# Patient Record
Sex: FEMALE | Race: WHITE | NOT HISPANIC OR LATINO | Employment: UNEMPLOYED | URBAN - METROPOLITAN AREA
[De-identification: names, ages, dates, MRNs, and addresses within clinical notes are randomized per-mention and may not be internally consistent; named-entity substitution may affect disease eponyms.]

---

## 2023-05-02 LAB
ALANINE AMINOTRANSFERASE (SGPT) (U/L) IN SER/PLAS: 74 U/L (ref 7–45)
ALBUMIN (G/DL) IN SER/PLAS: 4.3 G/DL (ref 3.4–5)
ALKALINE PHOSPHATASE (U/L) IN SER/PLAS: 95 U/L (ref 33–136)
ANION GAP IN SER/PLAS: 16 MMOL/L (ref 10–20)
ASPARTATE AMINOTRANSFERASE (SGOT) (U/L) IN SER/PLAS: 55 U/L (ref 9–39)
BILIRUBIN TOTAL (MG/DL) IN SER/PLAS: 0.8 MG/DL (ref 0–1.2)
CALCIUM (MG/DL) IN SER/PLAS: 9.7 MG/DL (ref 8.6–10.3)
CARBON DIOXIDE, TOTAL (MMOL/L) IN SER/PLAS: 24 MMOL/L (ref 21–32)
CHLORIDE (MMOL/L) IN SER/PLAS: 101 MMOL/L (ref 98–107)
CREATININE (MG/DL) IN SER/PLAS: 0.64 MG/DL (ref 0.5–1.05)
GFR FEMALE: >90 ML/MIN/1.73M2
GLUCOSE (MG/DL) IN SER/PLAS: 98 MG/DL (ref 74–99)
POTASSIUM (MMOL/L) IN SER/PLAS: 4.4 MMOL/L (ref 3.5–5.3)
PROTEIN TOTAL: 7.6 G/DL (ref 6.4–8.2)
SODIUM (MMOL/L) IN SER/PLAS: 137 MMOL/L (ref 136–145)
THYROTROPIN (MIU/L) IN SER/PLAS BY DETECTION LIMIT <= 0.05 MIU/L: 0.63 MIU/L (ref 0.44–3.98)
UREA NITROGEN (MG/DL) IN SER/PLAS: 14 MG/DL (ref 6–23)

## 2023-07-28 LAB
C REACTIVE PROTEIN (MG/L) IN SER/PLAS: 1.59 MG/DL
SEDIMENTATION RATE, ERYTHROCYTE: 12 MM/H (ref 0–30)

## 2023-08-29 PROBLEM — L29.8 PRURITIC ERYTHEMATOUS RASH: Status: ACTIVE | Noted: 2023-08-29

## 2023-08-29 PROBLEM — I82.90 VENOUS THROMBOSIS: Status: ACTIVE | Noted: 2023-08-29

## 2023-08-29 PROBLEM — R73.09 ELEVATED GLUCOSE: Status: ACTIVE | Noted: 2023-08-29

## 2023-08-29 PROBLEM — H53.452: Status: ACTIVE | Noted: 2023-08-29

## 2023-08-29 PROBLEM — M16.9 HIP ARTHROSIS: Status: ACTIVE | Noted: 2023-08-29

## 2023-08-29 PROBLEM — I47.10 SVT (SUPRAVENTRICULAR TACHYCARDIA) (CMS-HCC): Status: ACTIVE | Noted: 2023-08-29

## 2023-08-29 PROBLEM — I83.813 VARICOSE VEINS OF BILATERAL LOWER EXTREMITIES WITH PAIN: Status: ACTIVE | Noted: 2023-08-29

## 2023-08-29 PROBLEM — R10.9 RIGHT FLANK PAIN: Status: ACTIVE | Noted: 2023-08-29

## 2023-08-29 PROBLEM — C56.9 OVARIAN CA (MULTI): Status: ACTIVE | Noted: 2023-08-29

## 2023-08-29 PROBLEM — S42.209A PROXIMAL HUMERUS FRACTURE: Status: ACTIVE | Noted: 2023-08-29

## 2023-08-29 PROBLEM — B35.9 TINEA: Status: ACTIVE | Noted: 2023-08-29

## 2023-08-29 PROBLEM — J34.89 NASAL VESTIBULITIS: Status: ACTIVE | Noted: 2023-08-29

## 2023-08-29 PROBLEM — M70.62 GREATER TROCHANTERIC BURSITIS, LEFT: Status: ACTIVE | Noted: 2023-08-29

## 2023-08-29 PROBLEM — S72.009A FRACTURE OF NECK OF FEMUR (MULTI): Status: ACTIVE | Noted: 2023-08-29

## 2023-08-29 PROBLEM — R29.898 SHOULDER WEAKNESS: Status: ACTIVE | Noted: 2023-08-29

## 2023-08-29 PROBLEM — R26.9 GAIT ABNORMALITY: Status: ACTIVE | Noted: 2023-08-29

## 2023-08-29 PROBLEM — R07.9 CHEST PAIN: Status: ACTIVE | Noted: 2023-08-29

## 2023-08-29 PROBLEM — E55.9 VITAMIN D DEFICIENCY: Status: ACTIVE | Noted: 2023-08-29

## 2023-08-29 PROBLEM — M76.829 POSTERIOR TIBIAL TENDON DYSFUNCTION: Status: ACTIVE | Noted: 2023-08-29

## 2023-08-29 PROBLEM — M25.571 RIGHT ANKLE PAIN: Status: ACTIVE | Noted: 2023-08-29

## 2023-08-29 PROBLEM — M79.18 MYOFASCIAL PAIN: Status: ACTIVE | Noted: 2023-08-29

## 2023-08-29 PROBLEM — E03.9 HYPOTHYROIDISM: Status: ACTIVE | Noted: 2023-08-29

## 2023-08-29 PROBLEM — F41.1 GENERALIZED ANXIETY DISORDER: Status: ACTIVE | Noted: 2023-08-29

## 2023-08-29 PROBLEM — M25.612 STIFFNESS OF LEFT SHOULDER JOINT: Status: ACTIVE | Noted: 2023-08-29

## 2023-08-29 PROBLEM — E66.9 OBESITY: Status: ACTIVE | Noted: 2023-08-29

## 2023-08-29 PROBLEM — M54.12 CERVICAL RADICULITIS: Status: ACTIVE | Noted: 2023-08-29

## 2023-08-29 PROBLEM — F32.A DEPRESSION: Status: ACTIVE | Noted: 2023-08-29

## 2023-08-29 PROBLEM — H47.012 NAION (NON-ARTERITIC ANTERIOR ISCHEMIC OPTIC NEUROPATHY), LEFT EYE: Status: ACTIVE | Noted: 2023-08-29

## 2023-08-29 PROBLEM — E06.3 HASHIMOTO'S THYROIDITIS: Status: ACTIVE | Noted: 2023-08-29

## 2023-08-29 PROBLEM — L29.89 PRURITIC ERYTHEMATOUS RASH: Status: ACTIVE | Noted: 2023-08-29

## 2023-08-29 PROBLEM — L92.0 GRANULOMA ANNULARE: Status: ACTIVE | Noted: 2023-08-29

## 2023-08-29 PROBLEM — M81.0 POSTMENOPAUSAL OSTEOPOROSIS: Status: ACTIVE | Noted: 2023-08-29

## 2023-08-29 RX ORDER — ACETAMINOPHEN 500 MG/1
CAPSULE, LIQUID FILLED ORAL
COMMUNITY
End: 2023-11-07 | Stop reason: WASHOUT

## 2023-08-29 RX ORDER — CHOLECALCIFEROL (VITAMIN D3) 50 MCG
1 TABLET ORAL DAILY
COMMUNITY
End: 2023-11-07 | Stop reason: WASHOUT

## 2023-08-29 RX ORDER — ASCORBIC ACID 250 MG
1 TABLET ORAL DAILY
COMMUNITY

## 2023-08-29 RX ORDER — ASPIRIN 325 MG
1 TABLET ORAL 2 TIMES DAILY
COMMUNITY
End: 2023-11-07 | Stop reason: WASHOUT

## 2023-08-29 RX ORDER — FLUTICASONE PROPIONATE 50 MCG
2 SPRAY, SUSPENSION (ML) NASAL DAILY
COMMUNITY
Start: 2019-07-01 | End: 2023-11-07 | Stop reason: WASHOUT

## 2023-08-29 RX ORDER — FLUTICASONE PROPIONATE 50 MCG
1 SPRAY, SUSPENSION (ML) NASAL DAILY
COMMUNITY

## 2023-08-29 RX ORDER — OXYCODONE HYDROCHLORIDE 5 MG/1
2 TABLET ORAL EVERY 4 HOURS PRN
COMMUNITY
End: 2023-11-07 | Stop reason: WASHOUT

## 2023-08-29 RX ORDER — CYCLOSPORINE 0.5 MG/ML
1 EMULSION OPHTHALMIC 2 TIMES DAILY
COMMUNITY

## 2023-08-29 RX ORDER — ZINC GLUCONATE 50 MG
1 TABLET ORAL DAILY
COMMUNITY
End: 2023-11-07 | Stop reason: WASHOUT

## 2023-08-29 RX ORDER — CHOLECALCIFEROL (VITAMIN D3) 25 MCG
3 TABLET ORAL DAILY
COMMUNITY

## 2023-08-29 RX ORDER — TRIAMCINOLONE ACETONIDE 1 MG/G
1 CREAM TOPICAL
COMMUNITY
Start: 2019-07-16 | End: 2023-11-07 | Stop reason: WASHOUT

## 2023-08-29 RX ORDER — LEVOTHYROXINE SODIUM 125 UG/1
1 TABLET ORAL EVERY MORNING
COMMUNITY
End: 2023-11-13 | Stop reason: SDUPTHER

## 2023-08-29 RX ORDER — HYDROCODONE BITARTRATE AND ACETAMINOPHEN 5; 325 MG/1; MG/1
1 TABLET ORAL EVERY 6 HOURS PRN
COMMUNITY
Start: 2022-06-24 | End: 2023-11-07 | Stop reason: WASHOUT

## 2023-08-29 RX ORDER — LEVOTHYROXINE SODIUM 137 UG/1
1 TABLET ORAL DAILY
COMMUNITY
Start: 2013-12-03 | End: 2023-11-07 | Stop reason: WASHOUT

## 2023-08-29 RX ORDER — SODIUM FLUORIDE 5 MG/G
PASTE, DENTIFRICE DENTAL
COMMUNITY
Start: 2022-02-16

## 2023-11-06 ENCOUNTER — LAB (OUTPATIENT)
Dept: LAB | Facility: LAB | Age: 63
End: 2023-11-06
Payer: MEDICARE

## 2023-11-06 DIAGNOSIS — R63.4 ABNORMAL WEIGHT LOSS: ICD-10-CM

## 2023-11-06 DIAGNOSIS — E03.9 HYPOTHYROIDISM, UNSPECIFIED: Primary | ICD-10-CM

## 2023-11-06 DIAGNOSIS — R74.8 ABNORMAL LEVELS OF OTHER SERUM ENZYMES: ICD-10-CM

## 2023-11-06 LAB
ALBUMIN SERPL BCP-MCNC: 4.5 G/DL (ref 3.4–5)
ALP SERPL-CCNC: 101 U/L (ref 33–136)
ALT SERPL W P-5'-P-CCNC: 60 U/L (ref 7–45)
ANION GAP SERPL CALC-SCNC: 20 MMOL/L (ref 10–20)
AST SERPL W P-5'-P-CCNC: 60 U/L (ref 9–39)
BILIRUB SERPL-MCNC: 1.1 MG/DL (ref 0–1.2)
BUN SERPL-MCNC: 16 MG/DL (ref 6–23)
CALCIUM SERPL-MCNC: 9.8 MG/DL (ref 8.6–10.3)
CHLORIDE SERPL-SCNC: 100 MMOL/L (ref 98–107)
CHOLEST SERPL-MCNC: 255 MG/DL (ref 0–199)
CHOLESTEROL/HDL RATIO: 5.1
CO2 SERPL-SCNC: 23 MMOL/L (ref 21–32)
CREAT SERPL-MCNC: 0.63 MG/DL (ref 0.5–1.05)
GFR SERPL CREATININE-BSD FRML MDRD: >90 ML/MIN/1.73M*2
GLUCOSE SERPL-MCNC: 100 MG/DL (ref 74–99)
HDLC SERPL-MCNC: 50.3 MG/DL
LDLC SERPL CALC-MCNC: 181 MG/DL
NON HDL CHOLESTEROL: 205 MG/DL (ref 0–149)
POTASSIUM SERPL-SCNC: 4.7 MMOL/L (ref 3.5–5.3)
PROT SERPL-MCNC: 8 G/DL (ref 6.4–8.2)
SODIUM SERPL-SCNC: 138 MMOL/L (ref 136–145)
TRIGL SERPL-MCNC: 119 MG/DL (ref 0–149)
TSH SERPL-ACNC: 1.16 MIU/L (ref 0.44–3.98)
VLDL: 24 MG/DL (ref 0–40)

## 2023-11-06 PROCEDURE — 36415 COLL VENOUS BLD VENIPUNCTURE: CPT

## 2023-11-06 PROCEDURE — 80061 LIPID PANEL: CPT

## 2023-11-06 PROCEDURE — 80053 COMPREHEN METABOLIC PANEL: CPT

## 2023-11-06 PROCEDURE — 84443 ASSAY THYROID STIM HORMONE: CPT

## 2023-11-08 ENCOUNTER — OFFICE VISIT (OUTPATIENT)
Dept: PRIMARY CARE | Facility: CLINIC | Age: 63
End: 2023-11-08
Payer: COMMERCIAL

## 2023-11-08 VITALS
SYSTOLIC BLOOD PRESSURE: 116 MMHG | DIASTOLIC BLOOD PRESSURE: 78 MMHG | BODY MASS INDEX: 40.03 KG/M2 | OXYGEN SATURATION: 96 % | WEIGHT: 259.4 LBS | HEART RATE: 66 BPM

## 2023-11-08 DIAGNOSIS — E06.3 HASHIMOTO'S THYROIDITIS: ICD-10-CM

## 2023-11-08 DIAGNOSIS — E66.01 OBESITY, MORBID (MULTI): ICD-10-CM

## 2023-11-08 DIAGNOSIS — R73.09 ELEVATED GLUCOSE: ICD-10-CM

## 2023-11-08 DIAGNOSIS — E78.2 MIXED HYPERLIPIDEMIA: ICD-10-CM

## 2023-11-08 DIAGNOSIS — R74.8 ELEVATED LIVER ENZYMES: Primary | ICD-10-CM

## 2023-11-08 PROBLEM — M25.571 RIGHT ANKLE PAIN: Status: RESOLVED | Noted: 2023-08-29 | Resolved: 2023-11-08

## 2023-11-08 PROBLEM — R07.9 CHEST PAIN: Status: RESOLVED | Noted: 2023-08-29 | Resolved: 2023-11-08

## 2023-11-08 PROBLEM — E03.9 HYPOTHYROIDISM: Status: RESOLVED | Noted: 2023-08-29 | Resolved: 2023-11-08

## 2023-11-08 PROBLEM — M79.18 MYOFASCIAL PAIN: Status: RESOLVED | Noted: 2023-08-29 | Resolved: 2023-11-08

## 2023-11-08 PROBLEM — S72.009A FRACTURE OF NECK OF FEMUR (MULTI): Status: RESOLVED | Noted: 2023-08-29 | Resolved: 2023-11-08

## 2023-11-08 PROBLEM — M70.62 GREATER TROCHANTERIC BURSITIS, LEFT: Status: RESOLVED | Noted: 2023-08-29 | Resolved: 2023-11-08

## 2023-11-08 PROBLEM — S42.209A PROXIMAL HUMERUS FRACTURE: Status: RESOLVED | Noted: 2023-08-29 | Resolved: 2023-11-08

## 2023-11-08 PROBLEM — C56.9 OVARIAN CA (MULTI): Status: RESOLVED | Noted: 2023-08-29 | Resolved: 2023-11-08

## 2023-11-08 PROBLEM — F40.00 AGORAPHOBIA: Status: ACTIVE | Noted: 2023-11-08

## 2023-11-08 PROBLEM — R10.9 RIGHT FLANK PAIN: Status: RESOLVED | Noted: 2023-08-29 | Resolved: 2023-11-08

## 2023-11-08 PROBLEM — J34.89 NASAL VESTIBULITIS: Status: RESOLVED | Noted: 2023-08-29 | Resolved: 2023-11-08

## 2023-11-08 PROCEDURE — 1036F TOBACCO NON-USER: CPT | Performed by: FAMILY MEDICINE

## 2023-11-08 PROCEDURE — 99214 OFFICE O/P EST MOD 30 MIN: CPT | Performed by: FAMILY MEDICINE

## 2023-11-08 ASSESSMENT — ENCOUNTER SYMPTOMS
LOSS OF SENSATION IN FEET: 0
OCCASIONAL FEELINGS OF UNSTEADINESS: 1
DEPRESSION: 0

## 2023-11-08 ASSESSMENT — LIFESTYLE VARIABLES
HOW OFTEN DO YOU HAVE SIX OR MORE DRINKS ON ONE OCCASION: NEVER
SKIP TO QUESTIONS 9-10: 1
HOW MANY STANDARD DRINKS CONTAINING ALCOHOL DO YOU HAVE ON A TYPICAL DAY: 1 OR 2
AUDIT-C TOTAL SCORE: 4
HOW OFTEN DO YOU HAVE A DRINK CONTAINING ALCOHOL: 4 OR MORE TIMES A WEEK

## 2023-11-08 ASSESSMENT — PATIENT HEALTH QUESTIONNAIRE - PHQ9
1. LITTLE INTEREST OR PLEASURE IN DOING THINGS: NOT AT ALL
2. FEELING DOWN, DEPRESSED OR HOPELESS: NOT AT ALL
SUM OF ALL RESPONSES TO PHQ9 QUESTIONS 1 & 2: 0

## 2023-11-08 ASSESSMENT — PAIN SCALES - GENERAL: PAINLEVEL: 2

## 2023-11-08 NOTE — PROGRESS NOTES
Subjective   Patient ID: Genevieve Hernandez is a 62 y.o. female who presents for 6 MO WT.    Here for follow up.      Since last visit - saw Dr. Chirinos.  Recommended hip revision.      Pt has new brace on right ankle - exercising more.  Using silver sneaker.  More energy.  Cloths feeling better.               Review of Systems    Objective   /78 (BP Location: Right arm, Patient Position: Sitting)   Pulse 66   Wt 118 kg (259 lb 6.4 oz)   SpO2 96%   BMI 40.03 kg/m²     Physical Exam    Assessment/Plan   Diagnoses and all orders for this visit:  Elevated liver enzymes  Hashimoto's thyroiditis  Mixed hyperlipidemia  Elevated glucose    Patient Instructions   For elevated liver enzymes - recommend US of liver and blood work to rule out hepatitis.  Likely fatty liver disease.  Continue weight loss. Continue intermittent fasting.  Continue exercise.      For thyroid - normal range.      For cholesterol - we will check CT cardiac score.  If low risk - continue dietary changes/exercises.  For high risk we will discuss other options.      Follow up in 6 months for physical.  Recheck blood work prior.

## 2023-11-08 NOTE — PATIENT INSTRUCTIONS
For elevated liver enzymes - recommend US of liver and blood work to rule out hepatitis.  Likely fatty liver disease.  Continue weight loss. Continue intermittent fasting.  Continue exercise.      For thyroid - normal range.      For cholesterol - we will check CT cardiac score.  If low risk - continue dietary changes/exercises.  For high risk we will discuss other options.      Follow up in 6 months for physical.  Recheck blood work prior.

## 2023-11-13 DIAGNOSIS — E06.3 HASHIMOTO'S THYROIDITIS: Primary | ICD-10-CM

## 2023-11-13 RX ORDER — LEVOTHYROXINE SODIUM 125 UG/1
125 TABLET ORAL EVERY MORNING
Qty: 90 TABLET | Refills: 1 | Status: SHIPPED | OUTPATIENT
Start: 2023-11-13 | End: 2024-05-22 | Stop reason: SDUPTHER

## 2023-11-13 NOTE — TELEPHONE ENCOUNTER
Requested Prescriptions     Pending Prescriptions Disp Refills    levothyroxine (Synthroid) 125 mcg tablet 90 tablet 1     Sig: Take 1 tablet (125 mcg) by mouth once daily in the morning. On an empty stomach. For 90 days

## 2023-12-05 ENCOUNTER — ANCILLARY PROCEDURE (OUTPATIENT)
Dept: RADIOLOGY | Facility: HOSPITAL | Age: 63
End: 2023-12-05
Payer: COMMERCIAL

## 2023-12-05 DIAGNOSIS — R74.8 ELEVATED LIVER ENZYMES: ICD-10-CM

## 2023-12-05 PROCEDURE — 76705 ECHO EXAM OF ABDOMEN: CPT

## 2023-12-05 PROCEDURE — 76705 ECHO EXAM OF ABDOMEN: CPT | Performed by: RADIOLOGY

## 2023-12-13 ENCOUNTER — APPOINTMENT (OUTPATIENT)
Dept: RADIOLOGY | Facility: HOSPITAL | Age: 63
End: 2023-12-13
Payer: COMMERCIAL

## 2024-05-02 ENCOUNTER — LAB (OUTPATIENT)
Dept: LAB | Facility: LAB | Age: 64
End: 2024-05-02
Payer: COMMERCIAL

## 2024-05-02 DIAGNOSIS — R74.8 ELEVATED LIVER ENZYMES: ICD-10-CM

## 2024-05-02 LAB
FERRITIN SERPL-MCNC: 201 NG/ML (ref 8–150)
HBV SURFACE AG SERPL QL IA: NONREACTIVE
HCV AB SER QL: NONREACTIVE
IRON SATN MFR SERPL: 29 % (ref 25–45)
IRON SERPL-MCNC: 108 UG/DL (ref 35–150)
TIBC SERPL-MCNC: 377 UG/DL (ref 240–445)
UIBC SERPL-MCNC: 269 UG/DL (ref 110–370)

## 2024-05-02 PROCEDURE — 87340 HEPATITIS B SURFACE AG IA: CPT

## 2024-05-02 PROCEDURE — 36415 COLL VENOUS BLD VENIPUNCTURE: CPT

## 2024-05-02 PROCEDURE — 86803 HEPATITIS C AB TEST: CPT

## 2024-05-02 PROCEDURE — 83540 ASSAY OF IRON: CPT

## 2024-05-02 PROCEDURE — 83550 IRON BINDING TEST: CPT

## 2024-05-02 PROCEDURE — 82728 ASSAY OF FERRITIN: CPT

## 2024-05-07 ENCOUNTER — LAB (OUTPATIENT)
Dept: LAB | Facility: LAB | Age: 64
End: 2024-05-07
Payer: COMMERCIAL

## 2024-05-07 DIAGNOSIS — R74.8 ELEVATED LIVER ENZYMES: ICD-10-CM

## 2024-05-07 DIAGNOSIS — E06.3 HASHIMOTO'S THYROIDITIS: ICD-10-CM

## 2024-05-07 DIAGNOSIS — R73.09 ELEVATED GLUCOSE: ICD-10-CM

## 2024-05-07 DIAGNOSIS — E78.2 MIXED HYPERLIPIDEMIA: ICD-10-CM

## 2024-05-07 LAB
ALBUMIN SERPL BCP-MCNC: 4.3 G/DL (ref 3.4–5)
ALP SERPL-CCNC: 88 U/L (ref 33–136)
ALT SERPL W P-5'-P-CCNC: 38 U/L (ref 7–45)
ANION GAP SERPL CALC-SCNC: 14 MMOL/L (ref 10–20)
AST SERPL W P-5'-P-CCNC: 37 U/L (ref 9–39)
BASOPHILS # BLD AUTO: 0.04 X10*3/UL (ref 0–0.1)
BASOPHILS NFR BLD AUTO: 0.4 %
BILIRUB SERPL-MCNC: 0.8 MG/DL (ref 0–1.2)
BUN SERPL-MCNC: 13 MG/DL (ref 6–23)
CALCIUM SERPL-MCNC: 9.9 MG/DL (ref 8.6–10.3)
CHLORIDE SERPL-SCNC: 101 MMOL/L (ref 98–107)
CHOLEST SERPL-MCNC: 237 MG/DL (ref 0–199)
CHOLESTEROL/HDL RATIO: 4.3
CO2 SERPL-SCNC: 27 MMOL/L (ref 21–32)
CREAT SERPL-MCNC: 0.88 MG/DL (ref 0.5–1.05)
EGFRCR SERPLBLD CKD-EPI 2021: 75 ML/MIN/1.73M*2
EOSINOPHIL # BLD AUTO: 0.19 X10*3/UL (ref 0–0.7)
EOSINOPHIL NFR BLD AUTO: 2.1 %
ERYTHROCYTE [DISTWIDTH] IN BLOOD BY AUTOMATED COUNT: 12.5 % (ref 11.5–14.5)
GLUCOSE SERPL-MCNC: 84 MG/DL (ref 74–99)
HCT VFR BLD AUTO: 44.7 % (ref 36–46)
HDLC SERPL-MCNC: 55.3 MG/DL
HGB BLD-MCNC: 14.2 G/DL (ref 12–16)
IMM GRANULOCYTES # BLD AUTO: 0.03 X10*3/UL (ref 0–0.7)
IMM GRANULOCYTES NFR BLD AUTO: 0.3 % (ref 0–0.9)
LDLC SERPL CALC-MCNC: 138 MG/DL
LYMPHOCYTES # BLD AUTO: 2.07 X10*3/UL (ref 1.2–4.8)
LYMPHOCYTES NFR BLD AUTO: 22.4 %
MCH RBC QN AUTO: 31.8 PG (ref 26–34)
MCHC RBC AUTO-ENTMCNC: 31.8 G/DL (ref 32–36)
MCV RBC AUTO: 100 FL (ref 80–100)
MONOCYTES # BLD AUTO: 0.76 X10*3/UL (ref 0.1–1)
MONOCYTES NFR BLD AUTO: 8.2 %
NEUTROPHILS # BLD AUTO: 6.15 X10*3/UL (ref 1.2–7.7)
NEUTROPHILS NFR BLD AUTO: 66.6 %
NON HDL CHOLESTEROL: 182 MG/DL (ref 0–149)
NRBC BLD-RTO: 0 /100 WBCS (ref 0–0)
PLATELET # BLD AUTO: 310 X10*3/UL (ref 150–450)
POTASSIUM SERPL-SCNC: 4.7 MMOL/L (ref 3.5–5.3)
PROT SERPL-MCNC: 7.7 G/DL (ref 6.4–8.2)
RBC # BLD AUTO: 4.46 X10*6/UL (ref 4–5.2)
SODIUM SERPL-SCNC: 137 MMOL/L (ref 136–145)
TRIGL SERPL-MCNC: 217 MG/DL (ref 0–149)
TSH SERPL-ACNC: 3.09 MIU/L (ref 0.44–3.98)
VLDL: 43 MG/DL (ref 0–40)
WBC # BLD AUTO: 9.2 X10*3/UL (ref 4.4–11.3)

## 2024-05-07 PROCEDURE — 84443 ASSAY THYROID STIM HORMONE: CPT

## 2024-05-07 PROCEDURE — 85025 COMPLETE CBC W/AUTO DIFF WBC: CPT

## 2024-05-07 PROCEDURE — 83036 HEMOGLOBIN GLYCOSYLATED A1C: CPT

## 2024-05-07 PROCEDURE — 80053 COMPREHEN METABOLIC PANEL: CPT

## 2024-05-07 PROCEDURE — 36415 COLL VENOUS BLD VENIPUNCTURE: CPT

## 2024-05-07 PROCEDURE — 80061 LIPID PANEL: CPT

## 2024-05-08 LAB
EST. AVERAGE GLUCOSE BLD GHB EST-MCNC: 97 MG/DL
HBA1C MFR BLD: 5 %

## 2024-05-08 ASSESSMENT — PROMIS GLOBAL HEALTH SCALE
EMOTIONAL_PROBLEMS: SOMETIMES
RATE_PHYSICAL_HEALTH: GOOD
RATE_AVERAGE_PAIN: 7
CARRYOUT_SOCIAL_ACTIVITIES: FAIR
EMOTIONAL_PROBLEMS: SOMETIMES
RATE_AVERAGE_FATIGUE: MODERATE
RATE_SOCIAL_SATISFACTION: GOOD
RATE_GENERAL_HEALTH: GOOD
RATE_AVERAGE_FATIGUE: MODERATE
RATE_AVERAGE_PAIN: 7
CARRYOUT_PHYSICAL_ACTIVITIES: A LITTLE
RATE_MENTAL_HEALTH: GOOD
RATE_MENTAL_HEALTH: GOOD
RATE_PHYSICAL_HEALTH: GOOD
RATE_QUALITY_OF_LIFE: GOOD
CARRYOUT_SOCIAL_ACTIVITIES: FAIR
RATE_SOCIAL_SATISFACTION: GOOD
RATE_GENERAL_HEALTH: GOOD
RATE_QUALITY_OF_LIFE: GOOD
CARRYOUT_PHYSICAL_ACTIVITIES: A LITTLE

## 2024-05-09 ENCOUNTER — APPOINTMENT (OUTPATIENT)
Dept: PRIMARY CARE | Facility: CLINIC | Age: 64
End: 2024-05-09
Payer: MEDICARE

## 2024-05-10 ENCOUNTER — APPOINTMENT (OUTPATIENT)
Dept: PRIMARY CARE | Facility: CLINIC | Age: 64
End: 2024-05-10
Payer: MEDICARE

## 2024-05-19 PROBLEM — B35.9 TINEA: Status: RESOLVED | Noted: 2023-08-29 | Resolved: 2024-05-19

## 2024-05-19 PROBLEM — L29.8 PRURITIC ERYTHEMATOUS RASH: Status: RESOLVED | Noted: 2023-08-29 | Resolved: 2024-05-19

## 2024-05-19 PROBLEM — L29.89 PRURITIC ERYTHEMATOUS RASH: Status: RESOLVED | Noted: 2023-08-29 | Resolved: 2024-05-19

## 2024-05-21 ENCOUNTER — APPOINTMENT (OUTPATIENT)
Dept: PRIMARY CARE | Facility: CLINIC | Age: 64
End: 2024-05-21
Payer: COMMERCIAL

## 2024-05-22 DIAGNOSIS — E06.3 HASHIMOTO'S THYROIDITIS: ICD-10-CM

## 2024-05-22 RX ORDER — LEVOTHYROXINE SODIUM 125 UG/1
125 TABLET ORAL EVERY MORNING
Qty: 90 TABLET | Refills: 1 | Status: SHIPPED | OUTPATIENT
Start: 2024-05-22

## 2024-10-31 ENCOUNTER — PATIENT MESSAGE (OUTPATIENT)
Dept: PRIMARY CARE | Facility: CLINIC | Age: 64
End: 2024-10-31
Payer: COMMERCIAL

## 2024-10-31 DIAGNOSIS — E06.3 HASHIMOTO'S THYROIDITIS: ICD-10-CM

## 2024-10-31 RX ORDER — LEVOTHYROXINE SODIUM 125 UG/1
125 TABLET ORAL EVERY MORNING
Qty: 90 TABLET | Refills: 1 | Status: SHIPPED | OUTPATIENT
Start: 2024-10-31

## 2025-01-24 ENCOUNTER — APPOINTMENT (OUTPATIENT)
Dept: PRIMARY CARE | Facility: CLINIC | Age: 65
End: 2025-01-24
Payer: COMMERCIAL

## 2025-02-24 ENCOUNTER — APPOINTMENT (OUTPATIENT)
Dept: PRIMARY CARE | Facility: CLINIC | Age: 65
End: 2025-02-24
Payer: COMMERCIAL

## 2025-02-27 ENCOUNTER — OFFICE VISIT (OUTPATIENT)
Dept: PRIMARY CARE | Facility: CLINIC | Age: 65
End: 2025-02-27
Payer: COMMERCIAL

## 2025-02-27 VITALS
DIASTOLIC BLOOD PRESSURE: 88 MMHG | HEART RATE: 73 BPM | BODY MASS INDEX: 43.15 KG/M2 | SYSTOLIC BLOOD PRESSURE: 126 MMHG | WEIGHT: 279.6 LBS | TEMPERATURE: 98.4 F | OXYGEN SATURATION: 98 %

## 2025-02-27 DIAGNOSIS — E78.2 MIXED HYPERLIPIDEMIA: ICD-10-CM

## 2025-02-27 DIAGNOSIS — Z85.43 HISTORY OF OVARIAN CANCER: ICD-10-CM

## 2025-02-27 DIAGNOSIS — R74.8 ELEVATED LIVER ENZYMES: ICD-10-CM

## 2025-02-27 DIAGNOSIS — E55.9 VITAMIN D DEFICIENCY: ICD-10-CM

## 2025-02-27 DIAGNOSIS — R09.81 NASAL CONGESTION: ICD-10-CM

## 2025-02-27 DIAGNOSIS — E06.3 HASHIMOTO'S THYROIDITIS: ICD-10-CM

## 2025-02-27 DIAGNOSIS — R73.09 ELEVATED GLUCOSE: ICD-10-CM

## 2025-02-27 DIAGNOSIS — Z00.00 ROUTINE GENERAL MEDICAL EXAMINATION AT A HEALTH CARE FACILITY: Primary | ICD-10-CM

## 2025-02-27 DIAGNOSIS — E66.01 OBESITY, MORBID (MULTI): ICD-10-CM

## 2025-02-27 DIAGNOSIS — Z12.11 SCREENING FOR COLON CANCER: ICD-10-CM

## 2025-02-27 PROCEDURE — 99396 PREV VISIT EST AGE 40-64: CPT | Performed by: FAMILY MEDICINE

## 2025-02-27 PROCEDURE — 1036F TOBACCO NON-USER: CPT | Performed by: FAMILY MEDICINE

## 2025-02-27 RX ORDER — MONTELUKAST SODIUM 10 MG/1
10 TABLET ORAL NIGHTLY
Qty: 30 TABLET | Refills: 5 | Status: SHIPPED | OUTPATIENT
Start: 2025-02-27 | End: 2025-08-26

## 2025-02-27 ASSESSMENT — LIFESTYLE VARIABLES
HOW OFTEN DO YOU HAVE A DRINK CONTAINING ALCOHOL: 4 OR MORE TIMES A WEEK
AUDIT-C TOTAL SCORE: 4
SKIP TO QUESTIONS 9-10: 1
HOW MANY STANDARD DRINKS CONTAINING ALCOHOL DO YOU HAVE ON A TYPICAL DAY: 1 OR 2
HOW OFTEN DO YOU HAVE SIX OR MORE DRINKS ON ONE OCCASION: NEVER

## 2025-02-27 ASSESSMENT — ENCOUNTER SYMPTOMS
OCCASIONAL FEELINGS OF UNSTEADINESS: 0
LOSS OF SENSATION IN FEET: 0
DEPRESSION: 0

## 2025-02-27 ASSESSMENT — PROMIS GLOBAL HEALTH SCALE
RATE_GENERAL_HEALTH: FAIR
RATE_SOCIAL_SATISFACTION: FAIR
RATE_AVERAGE_FATIGUE: MODERATE
EMOTIONAL_PROBLEMS: SOMETIMES
CARRYOUT_PHYSICAL_ACTIVITIES: A LITTLE
RATE_AVERAGE_PAIN: 6
RATE_QUALITY_OF_LIFE: FAIR
RATE_PHYSICAL_HEALTH: FAIR
RATE_MENTAL_HEALTH: GOOD
CARRYOUT_SOCIAL_ACTIVITIES: FAIR

## 2025-02-27 ASSESSMENT — PATIENT HEALTH QUESTIONNAIRE - PHQ9
1. LITTLE INTEREST OR PLEASURE IN DOING THINGS: NOT AT ALL
SUM OF ALL RESPONSES TO PHQ9 QUESTIONS 1 & 2: 0
2. FEELING DOWN, DEPRESSED OR HOPELESS: NOT AT ALL

## 2025-02-27 ASSESSMENT — PAIN SCALES - GENERAL: PAINLEVEL_OUTOF10: 0-NO PAIN

## 2025-02-27 NOTE — PROGRESS NOTES
Subjective   Patient ID: Genevieve Hernandez is a 64 y.o. female who presents for Thyroid F/U.    Here for annual physical.     Patient would like her thyroid checked - fatigued, gaining weight.  Still doing fasting and [employer] 2-3 times a week.  Doing HIIT therapy.  Patient is doing weight watchers.  Not improving.      Still congestion - not improving.  Tried neilmed and flonase.  Still not improving.  Unable to sleep with closed mouth.  Patient has seen ENT.  Patient has had scope in the past.  Patient has humidifier in bedroom.  Waking up unrefreshed.  During day feels like needs to take a nap.  Snoring per .     Not sleeping well due to pain.      Patient has chronic hip pain - pt is trying to lose weight first.               Review of Systems    Objective   /88 (BP Location: Left arm, Patient Position: Sitting)   Pulse 73   Temp 36.9 °C (98.4 °F) (Temporal)   Wt 127 kg (279 lb 9.6 oz)   SpO2 98%   BMI 43.15 kg/m²     Physical Exam  Vitals reviewed.   Constitutional:       General: She is not in acute distress.  HENT:      Right Ear: Tympanic membrane and ear canal normal.      Left Ear: Tympanic membrane and ear canal normal.      Nose: Congestion and rhinorrhea present.      Mouth/Throat:      Pharynx: No posterior oropharyngeal erythema.   Cardiovascular:      Rate and Rhythm: Normal rate and regular rhythm.   Pulmonary:      Effort: Pulmonary effort is normal.      Breath sounds: No wheezing or rhonchi.   Musculoskeletal:      Right lower leg: No edema.      Left lower leg: No edema.      Comments: Impaired gait due to pain   Lymphadenopathy:      Cervical: No cervical adenopathy.   Neurological:      Mental Status: She is alert.         Assessment/Plan   Diagnoses and all orders for this visit:  Routine general medical examination at a health care facility  Hashimoto's thyroiditis  Elevated liver enzymes  Mixed hyperlipidemia  Elevated glucose  Obesity, morbid (Multi)  Nasal congestion  History of  ovarian cancer  Screening for colon cancer    Patient Instructions   Patient is here for physical.  Order for routine blood work.     For history of ovarian CA - we will check     For sinus congestion - unclear cause - negative scope in the past.  No benefit with nasal sinus rinse and flonase.  Recommend trial of singulair.  If no improvement we will check CT of sinuses.      For weight loss - we will check thyroid.  I am concern underlying sleep apnea.  If normal blood work we will recommend sleep study.      For osteoarthritis - if worsening we will do meloxicam (anti-inflammatory for pain).     Follow up in 4 months for weight check.

## 2025-02-27 NOTE — PATIENT INSTRUCTIONS
Patient is here for physical.  Order for routine blood work.     For history of ovarian CA - we will check     For sinus congestion - unclear cause - negative scope in the past.  No benefit with nasal sinus rinse and flonase.  Recommend trial of singulair.  If no improvement we will check CT of sinuses.      For weight loss - we will check thyroid.  I am concern underlying sleep apnea.  If normal blood work we will recommend sleep study.      For osteoarthritis - if worsening we will do meloxicam (anti-inflammatory for pain).     Follow up in 4 months for weight check.

## 2025-03-01 LAB
25(OH)D3+25(OH)D2 SERPL-MCNC: 25 NG/ML (ref 30–100)
ALBUMIN SERPL-MCNC: 4.5 G/DL (ref 3.6–5.1)
ALP SERPL-CCNC: 94 U/L (ref 37–153)
ALT SERPL-CCNC: 44 U/L (ref 6–29)
ANION GAP SERPL CALCULATED.4IONS-SCNC: 10 MMOL/L (CALC) (ref 7–17)
AST SERPL-CCNC: 31 U/L (ref 10–35)
BASOPHILS # BLD AUTO: 46 CELLS/UL (ref 0–200)
BASOPHILS NFR BLD AUTO: 0.5 %
BILIRUB SERPL-MCNC: 0.8 MG/DL (ref 0.2–1.2)
BUN SERPL-MCNC: 17 MG/DL (ref 7–25)
CALCIUM SERPL-MCNC: 10.1 MG/DL (ref 8.6–10.4)
CANCER AG125 SERPL-ACNC: NORMAL
CHLORIDE SERPL-SCNC: 100 MMOL/L (ref 98–110)
CHOLEST SERPL-MCNC: 250 MG/DL
CHOLEST/HDLC SERPL: 4.8 (CALC)
CO2 SERPL-SCNC: 28 MMOL/L (ref 20–32)
CREAT SERPL-MCNC: 0.6 MG/DL (ref 0.5–1.05)
EGFRCR SERPLBLD CKD-EPI 2021: 101 ML/MIN/1.73M2
EOSINOPHIL # BLD AUTO: 184 CELLS/UL (ref 15–500)
EOSINOPHIL NFR BLD AUTO: 2 %
ERYTHROCYTE [DISTWIDTH] IN BLOOD BY AUTOMATED COUNT: 11.8 % (ref 11–15)
EST. AVERAGE GLUCOSE BLD GHB EST-MCNC: 111 MG/DL
EST. AVERAGE GLUCOSE BLD GHB EST-SCNC: 6.2 MMOL/L
GLUCOSE SERPL-MCNC: 99 MG/DL (ref 65–139)
HBA1C MFR BLD: 5.5 % OF TOTAL HGB
HCT VFR BLD AUTO: 40.9 % (ref 35–45)
HDLC SERPL-MCNC: 52 MG/DL
HGB BLD-MCNC: 13.6 G/DL (ref 11.7–15.5)
LDLC SERPL CALC-MCNC: 162 MG/DL (CALC)
LYMPHOCYTES # BLD AUTO: 1969 CELLS/UL (ref 850–3900)
LYMPHOCYTES NFR BLD AUTO: 21.4 %
MCH RBC QN AUTO: 31.6 PG (ref 27–33)
MCHC RBC AUTO-ENTMCNC: 33.3 G/DL (ref 32–36)
MCV RBC AUTO: 94.9 FL (ref 80–100)
MONOCYTES # BLD AUTO: 653 CELLS/UL (ref 200–950)
MONOCYTES NFR BLD AUTO: 7.1 %
NEUTROPHILS # BLD AUTO: 6348 CELLS/UL (ref 1500–7800)
NEUTROPHILS NFR BLD AUTO: 69 %
NONHDLC SERPL-MCNC: 198 MG/DL (CALC)
PLATELET # BLD AUTO: 294 THOUSAND/UL (ref 140–400)
PMV BLD REES-ECKER: 11.5 FL (ref 7.5–12.5)
POTASSIUM SERPL-SCNC: 4.2 MMOL/L (ref 3.5–5.3)
PROT SERPL-MCNC: 7.7 G/DL (ref 6.1–8.1)
RBC # BLD AUTO: 4.31 MILLION/UL (ref 3.8–5.1)
SODIUM SERPL-SCNC: 138 MMOL/L (ref 135–146)
TRIGL SERPL-MCNC: 197 MG/DL
TSH SERPL-ACNC: 1.34 MIU/L (ref 0.4–4.5)
WBC # BLD AUTO: 9.2 THOUSAND/UL (ref 3.8–10.8)

## 2025-03-03 LAB
25(OH)D3+25(OH)D2 SERPL-MCNC: 25 NG/ML (ref 30–100)
ALBUMIN SERPL-MCNC: 4.5 G/DL (ref 3.6–5.1)
ALP SERPL-CCNC: 94 U/L (ref 37–153)
ALT SERPL-CCNC: 44 U/L (ref 6–29)
ANION GAP SERPL CALCULATED.4IONS-SCNC: 10 MMOL/L (CALC) (ref 7–17)
AST SERPL-CCNC: 31 U/L (ref 10–35)
BASOPHILS # BLD AUTO: 46 CELLS/UL (ref 0–200)
BASOPHILS NFR BLD AUTO: 0.5 %
BILIRUB SERPL-MCNC: 0.8 MG/DL (ref 0.2–1.2)
BUN SERPL-MCNC: 17 MG/DL (ref 7–25)
CALCIUM SERPL-MCNC: 10.1 MG/DL (ref 8.6–10.4)
CANCER AG125 SERPL-ACNC: 6 U/ML
CHLORIDE SERPL-SCNC: 100 MMOL/L (ref 98–110)
CHOLEST SERPL-MCNC: 250 MG/DL
CHOLEST/HDLC SERPL: 4.8 (CALC)
CO2 SERPL-SCNC: 28 MMOL/L (ref 20–32)
CREAT SERPL-MCNC: 0.6 MG/DL (ref 0.5–1.05)
EGFRCR SERPLBLD CKD-EPI 2021: 101 ML/MIN/1.73M2
EOSINOPHIL # BLD AUTO: 184 CELLS/UL (ref 15–500)
EOSINOPHIL NFR BLD AUTO: 2 %
ERYTHROCYTE [DISTWIDTH] IN BLOOD BY AUTOMATED COUNT: 11.8 % (ref 11–15)
EST. AVERAGE GLUCOSE BLD GHB EST-MCNC: 111 MG/DL
EST. AVERAGE GLUCOSE BLD GHB EST-SCNC: 6.2 MMOL/L
GLUCOSE SERPL-MCNC: 99 MG/DL (ref 65–139)
HBA1C MFR BLD: 5.5 % OF TOTAL HGB
HCT VFR BLD AUTO: 40.9 % (ref 35–45)
HDLC SERPL-MCNC: 52 MG/DL
HGB BLD-MCNC: 13.6 G/DL (ref 11.7–15.5)
LDLC SERPL CALC-MCNC: 162 MG/DL (CALC)
LYMPHOCYTES # BLD AUTO: 1969 CELLS/UL (ref 850–3900)
LYMPHOCYTES NFR BLD AUTO: 21.4 %
MCH RBC QN AUTO: 31.6 PG (ref 27–33)
MCHC RBC AUTO-ENTMCNC: 33.3 G/DL (ref 32–36)
MCV RBC AUTO: 94.9 FL (ref 80–100)
MONOCYTES # BLD AUTO: 653 CELLS/UL (ref 200–950)
MONOCYTES NFR BLD AUTO: 7.1 %
NEUTROPHILS # BLD AUTO: 6348 CELLS/UL (ref 1500–7800)
NEUTROPHILS NFR BLD AUTO: 69 %
NONHDLC SERPL-MCNC: 198 MG/DL (CALC)
PLATELET # BLD AUTO: 294 THOUSAND/UL (ref 140–400)
PMV BLD REES-ECKER: 11.5 FL (ref 7.5–12.5)
POTASSIUM SERPL-SCNC: 4.2 MMOL/L (ref 3.5–5.3)
PROT SERPL-MCNC: 7.7 G/DL (ref 6.1–8.1)
RBC # BLD AUTO: 4.31 MILLION/UL (ref 3.8–5.1)
SODIUM SERPL-SCNC: 138 MMOL/L (ref 135–146)
TRIGL SERPL-MCNC: 197 MG/DL
TSH SERPL-ACNC: 1.34 MIU/L (ref 0.4–4.5)
WBC # BLD AUTO: 9.2 THOUSAND/UL (ref 3.8–10.8)

## 2025-03-31 LAB — NONINV COLON CA DNA+OCC BLD SCRN STL QL: NEGATIVE

## 2025-05-26 ENCOUNTER — TELEPHONE (OUTPATIENT)
Dept: PRIMARY CARE | Facility: CLINIC | Age: 65
End: 2025-05-26
Payer: COMMERCIAL

## 2025-05-26 DIAGNOSIS — E06.3 HASHIMOTO'S THYROIDITIS: ICD-10-CM

## 2025-05-27 RX ORDER — LEVOTHYROXINE SODIUM 125 UG/1
TABLET ORAL
Qty: 90 TABLET | Refills: 1 | Status: SHIPPED | OUTPATIENT
Start: 2025-05-27

## 2025-05-27 NOTE — TELEPHONE ENCOUNTER
Refill: BRAND NAME ONLY  levothyroxine (Synthroid) 125 mcg tablet Take 1 tablet (125 mcg) by mouth once daily in the morning. On an empty stomach. For 90 days     Pharm:  [facility]

## 2025-06-27 ENCOUNTER — APPOINTMENT (OUTPATIENT)
Dept: PRIMARY CARE | Facility: CLINIC | Age: 65
End: 2025-06-27
Payer: COMMERCIAL

## 2025-07-08 DIAGNOSIS — Z12.31 ENCOUNTER FOR SCREENING MAMMOGRAM FOR BREAST CANCER: ICD-10-CM

## 2025-07-09 ENCOUNTER — TELEPHONE (OUTPATIENT)
Dept: PRIMARY CARE | Facility: CLINIC | Age: 65
End: 2025-07-09

## 2025-07-09 ENCOUNTER — OFFICE VISIT (OUTPATIENT)
Dept: PRIMARY CARE | Facility: CLINIC | Age: 65
End: 2025-07-09
Payer: COMMERCIAL

## 2025-07-09 VITALS
OXYGEN SATURATION: 98 % | TEMPERATURE: 97.3 F | WEIGHT: 277.8 LBS | HEART RATE: 72 BPM | BODY MASS INDEX: 42.87 KG/M2 | SYSTOLIC BLOOD PRESSURE: 138 MMHG | DIASTOLIC BLOOD PRESSURE: 86 MMHG

## 2025-07-09 DIAGNOSIS — F32.A DEPRESSION, UNSPECIFIED DEPRESSION TYPE: Primary | ICD-10-CM

## 2025-07-09 DIAGNOSIS — M16.9 HIP ARTHROSIS: ICD-10-CM

## 2025-07-09 DIAGNOSIS — R09.81 SINUS CONGESTION: ICD-10-CM

## 2025-07-09 PROBLEM — I47.10 SVT (SUPRAVENTRICULAR TACHYCARDIA): Status: RESOLVED | Noted: 2023-08-29 | Resolved: 2025-07-09

## 2025-07-09 PROCEDURE — 1036F TOBACCO NON-USER: CPT | Performed by: FAMILY MEDICINE

## 2025-07-09 PROCEDURE — 99214 OFFICE O/P EST MOD 30 MIN: CPT | Performed by: FAMILY MEDICINE

## 2025-07-09 RX ORDER — BUPROPION HYDROCHLORIDE 100 MG/1
100 TABLET, FILM COATED ORAL 2 TIMES DAILY
Qty: 60 TABLET | Refills: 2 | Status: SHIPPED | OUTPATIENT
Start: 2025-07-09 | End: 2026-07-09

## 2025-07-09 RX ORDER — BUPROPION HYDROCHLORIDE 75 MG/1
75 TABLET ORAL 2 TIMES DAILY
Qty: 60 TABLET | Refills: 1 | Status: SHIPPED | OUTPATIENT
Start: 2025-07-09 | End: 2025-07-09

## 2025-07-09 ASSESSMENT — ENCOUNTER SYMPTOMS
DEPRESSION: 0
OCCASIONAL FEELINGS OF UNSTEADINESS: 0
LOSS OF SENSATION IN FEET: 0

## 2025-07-09 ASSESSMENT — LIFESTYLE VARIABLES
SKIP TO QUESTIONS 9-10: 1
HOW MANY STANDARD DRINKS CONTAINING ALCOHOL DO YOU HAVE ON A TYPICAL DAY: 1 OR 2
AUDIT-C TOTAL SCORE: 4
HOW OFTEN DO YOU HAVE A DRINK CONTAINING ALCOHOL: 4 OR MORE TIMES A WEEK
HOW OFTEN DO YOU HAVE SIX OR MORE DRINKS ON ONE OCCASION: NEVER

## 2025-07-09 ASSESSMENT — PAIN SCALES - GENERAL: PAINLEVEL_OUTOF10: 0-NO PAIN

## 2025-07-09 ASSESSMENT — PATIENT HEALTH QUESTIONNAIRE - PHQ9
SUM OF ALL RESPONSES TO PHQ9 QUESTIONS 1 & 2: 0
2. FEELING DOWN, DEPRESSED OR HOPELESS: NOT AT ALL
1. LITTLE INTEREST OR PLEASURE IN DOING THINGS: NOT AT ALL

## 2025-07-09 NOTE — TELEPHONE ENCOUNTER
They need clarification on script. Received rx for bupropion 75 mg BEN. She said they do not make wellbutrin in immediate release, only ER tablet.

## 2025-07-09 NOTE — PATIENT INSTRUCTIONS
Patient is here for follow up.     For sinuses - ok to try generic mucinex.  If working well stop antihistamine.  If you are interested we can always order allergy testing through ENT.      For weight loss - discussed options.  Recommend trial of wellbutrin (was on generic in past - caused reaction).  We will retry.  If not covered we will discuss metformin.      For hip pain - hx of replacement - Dr. Chirinos believes due to erosion of socket.  If you are interested in further opinion - recommend Dr. Wolfe at [facility]     Follow up in 6 months.

## 2025-07-09 NOTE — PROGRESS NOTES
Subjective   Patient ID: Genevieve Hernandez is a 64 y.o. female who presents for 4 MO F/U.    Here for follow up.      Weight gain  -Doing weight watchers.  Patient has been swimming every day.   Drinking water.  Alcohol periodically - cut out alcohol in past - no different.   Patient has hashimotos.  Tried wellbutrin in past -     Still congestion -   -Tried singulair - caused depression.  Had to stop.Tried neilmed and flonase.  Still not improving.  Unable to sleep with closed mouth.  Patient has seen ENT.  Patient has had scope in the past.  Patient has humidifier in bedroom.  Waking up unrefreshed.  During day feels like needs to take a nap.  Snoring per . Using antihistamine - tolerating well.  Improved but still present. No hx of allergy testing.                   Review of Systems    Objective   /86 (BP Location: Right arm, Patient Position: Sitting)   Pulse 72   Temp 36.3 °C (97.3 °F) (Temporal)   Wt 126 kg (277 lb 12.8 oz)   SpO2 98%   BMI 42.87 kg/m²     Physical Exam  Vitals reviewed.   Constitutional:       General: She is not in acute distress.     Appearance: She is obese.   Cardiovascular:      Rate and Rhythm: Normal rate and regular rhythm.   Pulmonary:      Effort: Pulmonary effort is normal.      Breath sounds: No wheezing or rhonchi.   Musculoskeletal:      Right lower leg: No edema.      Left lower leg: No edema.   Lymphadenopathy:      Cervical: No cervical adenopathy.   Neurological:      Mental Status: She is alert.         Assessment/Plan   Diagnoses and all orders for this visit:  Depression, unspecified depression type  -     buPROPion (Wellbutrin) 75 mg tablet; Take 1 tablet (75 mg) by mouth 2 times a day.  Hip arthrosis    Patient Instructions   Patient is here for follow up.     For sinuses - ok to try generic mucinex.  If working well stop antihistamine.  If you are interested we can always order allergy testing through ENT.      For weight loss - discussed options.   Recommend trial of wellbutrin (was on generic in past - caused reaction).  We will retry.  If not covered we will discuss metformin.      For hip pain - hx of replacement - Dr. Chirinos believes due to erosion of socket.  If you are interested in further opinion - recommend Dr. Wolfe at [facility]     Follow up in 6 months.     Note: spoke with Dr. Chirinos via chat - he believes hip issue due to erosion and not leg length issue and would recommend revision.  Pt would like to think about it.

## 2025-07-11 ENCOUNTER — APPOINTMENT (OUTPATIENT)
Dept: PRIMARY CARE | Facility: CLINIC | Age: 65
End: 2025-07-11
Payer: COMMERCIAL

## 2025-08-08 ENCOUNTER — HOSPITAL ENCOUNTER (OUTPATIENT)
Dept: RADIOLOGY | Facility: CLINIC | Age: 65
Discharge: HOME | End: 2025-08-08
Payer: COMMERCIAL

## 2025-08-08 ENCOUNTER — OFFICE VISIT (OUTPATIENT)
Dept: ORTHOPEDIC SURGERY | Facility: CLINIC | Age: 65
End: 2025-08-08
Payer: COMMERCIAL

## 2025-08-08 VITALS — WEIGHT: 274 LBS | BODY MASS INDEX: 41.52 KG/M2 | HEIGHT: 68 IN

## 2025-08-08 DIAGNOSIS — M25.552 LEFT HIP PAIN: ICD-10-CM

## 2025-08-08 DIAGNOSIS — M70.62 TROCHANTERIC BURSITIS OF LEFT HIP: Primary | ICD-10-CM

## 2025-08-08 PROCEDURE — 73502 X-RAY EXAM HIP UNI 2-3 VIEWS: CPT | Mod: LT

## 2025-08-08 PROCEDURE — 3008F BODY MASS INDEX DOCD: CPT | Performed by: ORTHOPAEDIC SURGERY

## 2025-08-08 PROCEDURE — 73502 X-RAY EXAM HIP UNI 2-3 VIEWS: CPT | Mod: LEFT SIDE | Performed by: RADIOLOGY

## 2025-08-08 PROCEDURE — 99202 OFFICE O/P NEW SF 15 MIN: CPT | Performed by: ORTHOPAEDIC SURGERY

## 2025-08-08 PROCEDURE — 99213 OFFICE O/P EST LOW 20 MIN: CPT | Performed by: ORTHOPAEDIC SURGERY

## 2025-08-08 ASSESSMENT — PAIN SCALES - GENERAL: PAINLEVEL_OUTOF10: 3

## 2025-08-08 ASSESSMENT — PAIN - FUNCTIONAL ASSESSMENT: PAIN_FUNCTIONAL_ASSESSMENT: 0-10

## 2025-08-16 ENCOUNTER — PATIENT MESSAGE (OUTPATIENT)
Dept: PRIMARY CARE | Facility: CLINIC | Age: 65
End: 2025-08-16
Payer: COMMERCIAL

## 2025-08-25 PROBLEM — M25.552 LEFT HIP PAIN: Status: ACTIVE | Noted: 2025-08-25

## 2025-08-25 PROBLEM — M70.62 TROCHANTERIC BURSITIS OF LEFT HIP: Status: ACTIVE | Noted: 2025-08-25
